# Patient Record
Sex: MALE | NOT HISPANIC OR LATINO
[De-identification: names, ages, dates, MRNs, and addresses within clinical notes are randomized per-mention and may not be internally consistent; named-entity substitution may affect disease eponyms.]

---
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
## 2022-11-22 ENCOUNTER — APPOINTMENT (OUTPATIENT)
Dept: ORTHOPEDIC SURGERY | Facility: CLINIC | Age: 22
End: 2022-11-22

## 2022-11-22 PROCEDURE — 73110 X-RAY EXAM OF WRIST: CPT | Mod: LT

## 2022-11-22 PROCEDURE — 20605 DRAIN/INJ JOINT/BURSA W/O US: CPT | Mod: LT

## 2022-11-22 PROCEDURE — 99204 OFFICE O/P NEW MOD 45 MIN: CPT | Mod: 25

## 2023-03-27 ENCOUNTER — APPOINTMENT (OUTPATIENT)
Dept: ORTHOPEDIC SURGERY | Facility: CLINIC | Age: 23
End: 2023-03-27
Payer: COMMERCIAL

## 2023-03-27 PROCEDURE — 99214 OFFICE O/P EST MOD 30 MIN: CPT | Mod: 25

## 2023-03-27 PROCEDURE — 20605 DRAIN/INJ JOINT/BURSA W/O US: CPT | Mod: LT

## 2023-05-08 ENCOUNTER — APPOINTMENT (OUTPATIENT)
Dept: ORTHOPEDIC SURGERY | Facility: CLINIC | Age: 23
End: 2023-05-08
Payer: COMMERCIAL

## 2023-05-08 VITALS — WEIGHT: 165 LBS | BODY MASS INDEX: 23.62 KG/M2 | HEIGHT: 70 IN

## 2023-05-08 DIAGNOSIS — M25.532 PAIN IN LEFT WRIST: ICD-10-CM

## 2023-05-08 DIAGNOSIS — S69.82XD OTHER SPECIFIED INJURIES OF LEFT WRIST, HAND AND FINGER(S), SUBSEQUENT ENCOUNTER: ICD-10-CM

## 2023-05-08 PROCEDURE — 99214 OFFICE O/P EST MOD 30 MIN: CPT | Mod: 25

## 2023-05-08 PROCEDURE — 99072 ADDL SUPL MATRL&STAF TM PHE: CPT

## 2023-05-10 ENCOUNTER — OUTPATIENT (OUTPATIENT)
Dept: OUTPATIENT SERVICES | Facility: HOSPITAL | Age: 23
LOS: 1 days | End: 2023-05-10
Payer: COMMERCIAL

## 2023-05-10 ENCOUNTER — APPOINTMENT (OUTPATIENT)
Dept: MRI IMAGING | Facility: CLINIC | Age: 23
End: 2023-05-10
Payer: COMMERCIAL

## 2023-05-10 DIAGNOSIS — S69.82XD OTHER SPECIFIED INJURIES OF LEFT WRIST, HAND AND FINGER(S), SUBSEQUENT ENCOUNTER: ICD-10-CM

## 2023-05-10 DIAGNOSIS — M25.532 PAIN IN LEFT WRIST: ICD-10-CM

## 2023-05-10 PROCEDURE — 73221 MRI JOINT UPR EXTREM W/O DYE: CPT | Mod: 26,LT

## 2023-05-10 PROCEDURE — 73221 MRI JOINT UPR EXTREM W/O DYE: CPT

## 2023-05-25 ENCOUNTER — APPOINTMENT (OUTPATIENT)
Dept: ORTHOPEDIC SURGERY | Facility: CLINIC | Age: 23
End: 2023-05-25
Payer: COMMERCIAL

## 2023-05-25 DIAGNOSIS — M77.8 OTHER ENTHESOPATHIES, NOT ELSEWHERE CLASSIFIED: ICD-10-CM

## 2023-05-25 PROCEDURE — 99214 OFFICE O/P EST MOD 30 MIN: CPT | Mod: 25

## 2023-05-25 PROCEDURE — 20551 NJX 1 TENDON ORIGIN/INSJ: CPT | Mod: LT

## 2023-09-20 ENCOUNTER — OUTPATIENT (OUTPATIENT)
Dept: OUTPATIENT SERVICES | Facility: HOSPITAL | Age: 23
LOS: 1 days | End: 2023-09-20
Payer: COMMERCIAL

## 2023-09-20 ENCOUNTER — APPOINTMENT (OUTPATIENT)
Dept: MRI IMAGING | Facility: CLINIC | Age: 23
End: 2023-09-20
Payer: COMMERCIAL

## 2023-09-20 DIAGNOSIS — M25.512 PAIN IN LEFT SHOULDER: ICD-10-CM

## 2023-09-20 PROCEDURE — 73221 MRI JOINT UPR EXTREM W/O DYE: CPT

## 2023-09-20 PROCEDURE — 73221 MRI JOINT UPR EXTREM W/O DYE: CPT | Mod: 26,LT

## 2024-04-26 ENCOUNTER — NON-APPOINTMENT (OUTPATIENT)
Age: 24
End: 2024-04-26

## 2024-04-30 ENCOUNTER — APPOINTMENT (OUTPATIENT)
Dept: ORTHOPEDIC SURGERY | Facility: CLINIC | Age: 24
End: 2024-04-30
Payer: COMMERCIAL

## 2024-04-30 ENCOUNTER — OUTPATIENT (OUTPATIENT)
Dept: OUTPATIENT SERVICES | Facility: HOSPITAL | Age: 24
LOS: 1 days | End: 2024-04-30
Payer: COMMERCIAL

## 2024-04-30 ENCOUNTER — APPOINTMENT (OUTPATIENT)
Dept: MRI IMAGING | Facility: CLINIC | Age: 24
End: 2024-04-30
Payer: COMMERCIAL

## 2024-04-30 DIAGNOSIS — M25.512 PAIN IN LEFT SHOULDER: ICD-10-CM

## 2024-04-30 PROCEDURE — 73221 MRI JOINT UPR EXTREM W/O DYE: CPT

## 2024-04-30 PROCEDURE — 99214 OFFICE O/P EST MOD 30 MIN: CPT

## 2024-04-30 PROCEDURE — 73221 MRI JOINT UPR EXTREM W/O DYE: CPT | Mod: 26,LT

## 2024-04-30 NOTE — PHYSICAL EXAM
[de-identified] : General Exam  Well developed, well nourished No apparent distress Oriented to person, place, and time Mood: Normal Affect: Normal Balance and coordination: Normal Gait: Normal  Left shoulder exam  Inspection: No swelling, ecchymosis or gross deformity. Skin: No masses, No lesions Tenderness: No bicipital tenderness, no tenderness to the greater tuberosity/RTC insertion, no anterior shoulder/lesser tuberosity tenderness. No tenderness SC joint, clavicle, AC joint. Instability: 1+ anterior load-and-shift negative apprehension 2+ posterior load-and-shift positive jerk positive Melissa function is 61 ROM: 160/60/T6 Impingement tests: Positive Jain AC Joint: no pain with cross arm testing Biceps: Negative speed Strength: 5/5 abduction, external rotation, and internal rotation Neuro: AIN, PIN, Ulnar nerve motor intact Sensation: Intact to light touch in radial, median, ulnar, and axillary nerve distributions Vasc: 2+ radial pulse [de-identified] :  The following radiographs were ordered and read by me during this patients visit. I reviewed each radiograph in detail with the patient and discussed the findings as highlighted below.   3 views left shoulder obtained today glenohumeral joint is well-maintained normal alignment no fracture  MRI reviewed from September 2023 with posterior labral tearing and a reverse Hill-Sachs impaction fracture

## 2024-04-30 NOTE — HISTORY OF PRESENT ILLNESS
[de-identified] : 23-year-old male here for follow-up of left shoulder injury.  He has a known posterior labral tear he was able to play this lacrosse season with that injury.  He is continue to experience pain and instability he is here today to discuss surgical treatment The patient's past medical history, past surgical history, medications, allergies, and social history were reviewed by me today with the patient and documented accordingly. In addition, the patient's family history, which is noncontributory to this visit, was also reviewed.

## 2024-04-30 NOTE — DISCUSSION/SUMMARY
[de-identified] : 23-year-old male left shoulder posterior instability.  We will obtain a new MRI to further evaluate.  We discussed treatment options at length we discussed nonoperative catheter modification physical therapy.  We discussed surgical treatment.  Left shoulder arthroscopy posterior labral repair risk benefits alternatives discussed at length including not limited to risks such as bleeding infection nerve or vessel injury continued pain stiffness redislocation need for future surgery medical complications such as DVT or PE and anesthesia complications.  All questions were answered she will schedule at her convenience

## 2024-05-01 ENCOUNTER — NON-APPOINTMENT (OUTPATIENT)
Age: 24
End: 2024-05-01

## 2024-05-06 ENCOUNTER — NON-APPOINTMENT (OUTPATIENT)
Age: 24
End: 2024-05-06

## 2024-05-06 ENCOUNTER — TRANSCRIPTION ENCOUNTER (OUTPATIENT)
Age: 24
End: 2024-05-06

## 2024-05-06 ENCOUNTER — APPOINTMENT (OUTPATIENT)
Dept: ORTHOPEDIC SURGERY | Facility: CLINIC | Age: 24
End: 2024-05-06
Payer: COMMERCIAL

## 2024-05-06 VITALS
HEART RATE: 86 BPM | TEMPERATURE: 98.2 F | SYSTOLIC BLOOD PRESSURE: 111 MMHG | DIASTOLIC BLOOD PRESSURE: 74 MMHG | OXYGEN SATURATION: 98 % | RESPIRATION RATE: 12 BRPM

## 2024-05-06 VITALS — WEIGHT: 175 LBS | HEIGHT: 70 IN | BODY MASS INDEX: 25.05 KG/M2

## 2024-05-06 DIAGNOSIS — Z01.818 ENCOUNTER FOR OTHER PREPROCEDURAL EXAMINATION: ICD-10-CM

## 2024-05-06 PROCEDURE — 99202 OFFICE O/P NEW SF 15 MIN: CPT

## 2024-05-06 NOTE — HISTORY OF PRESENT ILLNESS
[de-identified] : Patient is here today for preoperative clearance for left shoulder arthroscopic surgery.  Patient has no complaints today, he is feeling well.  Patient is not taking any medications at this time.  No significant past medical history.

## 2024-05-06 NOTE — DISCUSSION/SUMMARY
[de-identified] : Patient is medically optimized for low risk upcoming medical procedure, arthroscopy of the left shoulder.   This note was generated using dragon medical dictation software.  A reasonable effort has been made for proofreading its contents, but typos may still remain.  If there are any questions or points of clarification needed please notify my office.

## 2024-05-06 NOTE — PHYSICAL EXAM
[de-identified] : Constitutional: Well-nourished, well-developed, No acute distress Respiratory:  Good respiratory effort, no SOB Lymphatic: No regional lymphadenopathy, no lymphedema Psychiatric: Pleasant and normal affect, alert and oriented x3 Skin: Clean dry and intact B/L UE/LE Musculoskeletal: normal except where as noted in regional exam

## 2024-05-15 ENCOUNTER — TRANSCRIPTION ENCOUNTER (OUTPATIENT)
Age: 24
End: 2024-05-15

## 2024-05-16 ENCOUNTER — TRANSCRIPTION ENCOUNTER (OUTPATIENT)
Age: 24
End: 2024-05-16

## 2024-05-16 ENCOUNTER — APPOINTMENT (OUTPATIENT)
Dept: ORTHOPEDIC SURGERY | Facility: HOSPITAL | Age: 24
End: 2024-05-16

## 2024-05-16 ENCOUNTER — OUTPATIENT (OUTPATIENT)
Dept: OUTPATIENT SERVICES | Facility: HOSPITAL | Age: 24
LOS: 1 days | Discharge: HOME | End: 2024-05-16
Payer: COMMERCIAL

## 2024-05-16 VITALS
SYSTOLIC BLOOD PRESSURE: 130 MMHG | DIASTOLIC BLOOD PRESSURE: 80 MMHG | HEART RATE: 83 BPM | OXYGEN SATURATION: 98 % | RESPIRATION RATE: 17 BRPM

## 2024-05-16 VITALS
TEMPERATURE: 98 F | DIASTOLIC BLOOD PRESSURE: 75 MMHG | HEIGHT: 70 IN | WEIGHT: 175.05 LBS | HEART RATE: 76 BPM | RESPIRATION RATE: 14 BRPM | SYSTOLIC BLOOD PRESSURE: 117 MMHG

## 2024-05-16 PROCEDURE — 29806 SHO ARTHRS SRG CAPSULORRAPHY: CPT | Mod: 22,LT

## 2024-05-16 DEVICE — ANCHOR SUT PEEK PUSHLOCK 2.9X12.5MM: Type: IMPLANTABLE DEVICE | Site: LEFT | Status: FUNCTIONAL

## 2024-05-16 DEVICE — ANCHOR SUT BIO-COMP PUSHLOCK 2.9X12.5MM MUST ORD IN 5EA: Type: IMPLANTABLE DEVICE | Site: LEFT | Status: FUNCTIONAL

## 2024-05-16 DEVICE — ANCHOR SUT TAK BIO-COMP W/ 2 FIBERWIRE 3X14.5MM: Type: IMPLANTABLE DEVICE | Site: LEFT | Status: FUNCTIONAL

## 2024-05-16 RX ORDER — ONDANSETRON 8 MG/1
1 TABLET, FILM COATED ORAL
Qty: 12 | Refills: 0
Start: 2024-05-16 | End: 2024-05-19

## 2024-05-16 RX ORDER — OXYCODONE HYDROCHLORIDE 5 MG/1
1 TABLET ORAL
Qty: 20 | Refills: 0
Start: 2024-05-16 | End: 2024-05-20

## 2024-05-16 RX ORDER — FENTANYL CITRATE 50 UG/ML
25 INJECTION INTRAVENOUS ONCE
Refills: 0 | Status: DISCONTINUED | OUTPATIENT
Start: 2024-05-16 | End: 2024-05-16

## 2024-05-16 RX ORDER — FENTANYL CITRATE 50 UG/ML
50 INJECTION INTRAVENOUS ONCE
Refills: 0 | Status: DISCONTINUED | OUTPATIENT
Start: 2024-05-16 | End: 2024-05-16

## 2024-05-16 RX ORDER — METOCLOPRAMIDE HCL 10 MG
10 TABLET ORAL ONCE
Refills: 0 | Status: DISCONTINUED | OUTPATIENT
Start: 2024-05-16 | End: 2024-05-16

## 2024-05-16 RX ORDER — DOCUSATE SODIUM 100 MG
1 CAPSULE ORAL
Qty: 15 | Refills: 0
Start: 2024-05-16 | End: 2024-05-20

## 2024-05-16 RX ORDER — SODIUM CHLORIDE 9 MG/ML
1000 INJECTION, SOLUTION INTRAVENOUS
Refills: 0 | Status: DISCONTINUED | OUTPATIENT
Start: 2024-05-16 | End: 2024-05-16

## 2024-05-16 NOTE — ASU PATIENT PROFILE, ADULT - FALL HARM RISK - HARM RISK INTERVENTIONS

## 2024-05-16 NOTE — ASU DISCHARGE PLAN (ADULT/PEDIATRIC) - CARE PROVIDER_API CALL
Prateek Burleson  Orthopaedic Surgery  [address] [facility], [address]  [place] [address]  Phone: [phone]  Fax: [phone]  Follow Up Time:

## 2024-05-16 NOTE — ASU PATIENT PROFILE, ADULT - VISION (WITH CORRECTIVE LENSES IF THE PATIENT USUALLY WEARS THEM):
prescription eyeglassess/Severely impaired: cannot locate objects without hearing or touching them or patient nonresponsive.

## 2024-05-22 DIAGNOSIS — M25.312 OTHER INSTABILITY, LEFT SHOULDER: ICD-10-CM

## 2024-05-22 DIAGNOSIS — M24.812 OTHER SPECIFIC JOINT DERANGEMENTS OF LEFT SHOULDER, NOT ELSEWHERE CLASSIFIED: ICD-10-CM

## 2024-05-28 ENCOUNTER — APPOINTMENT (OUTPATIENT)
Dept: ORTHOPEDIC SURGERY | Facility: CLINIC | Age: 24
End: 2024-05-28
Payer: COMMERCIAL

## 2024-05-28 DIAGNOSIS — M25.512 PAIN IN LEFT SHOULDER: ICD-10-CM

## 2024-05-28 PROCEDURE — 99024 POSTOP FOLLOW-UP VISIT: CPT

## 2024-05-28 NOTE — HISTORY OF PRESENT ILLNESS
[de-identified] : L shoulder [de-identified] : 23 y/o M s/p Left shoulder arthroscopy, posterior labral repair, and capsulorrhaphy 05/16/2024.  He is overall doing well.  His pain is controlled he is using his sling.  He is headed home later this week for the summer and will return to campus in the fall.  He is using the sling and external rotation. [de-identified] : left shoulder exam inc healed well. no erythema  no pain gentle passive  rom able to flex/ext all fingers  silt r/u/m/ax bcr [de-identified] : 23 y/o M s/p Left shoulder arthroscopy, posterior labral repair, and capsulorrhaphy 05/16/2024. [de-identified] : We reviewed postoperative protocol and restrictions.  We reviewed intraoperative photographs.  He is given a copy of the postoperative protocol operative report preoperative note and MRI along with his arthroscopic pictures to bring to his physical therapist when he returns home.  I would normally recommend a follow-up appointment in 1 month and we will schedule a phone call at that time through his  to touch base.  He is aware to reach out with any questions regarding pain function or any other issues that may arise in the postoperative period all questions were answered

## 2024-06-27 ENCOUNTER — NON-APPOINTMENT (OUTPATIENT)
Age: 24
End: 2024-06-27

## 2025-03-04 ENCOUNTER — NON-APPOINTMENT (OUTPATIENT)
Age: 25
End: 2025-03-04

## (undated) DEVICE — SUT LASSO 90 DEGREE

## (undated) DEVICE — ARTHREX KIT SUTURETAK 3MM

## (undated) DEVICE — SOL IRR LR 3000ML

## (undated) DEVICE — GLV 7.5 PROTEXIS (WHITE)

## (undated) DEVICE — ARTHREX KIT FOR 2.9MM PUSHLOCK SHORT

## (undated) DEVICE — SHAVER BLADE GREAT WHITE 4.2MM

## (undated) DEVICE — DRAPE STERI-DRAPE INCISE 19X17"

## (undated) DEVICE — POSITIONER S&N SPIDER STABILIZATION KIT SHOULDER

## (undated) DEVICE — DRAPE SPLIT SHEET 77" X 120"

## (undated) DEVICE — WARMING BLANKET LOWER ADULT

## (undated) DEVICE — CANNULA ARTHREX PARTIALLY THREADED 5.75MMX7CM ORANGE

## (undated) DEVICE — GLV 8 PROTEXIS (WHITE)

## (undated) DEVICE — SUT LASSO 25 DEGREE LEFT TIGHT CURVE

## (undated) DEVICE — VENODYNE/SCD SLEEVE CALF MEDIUM

## (undated) DEVICE — PACK KNEE ARTHROSCOPY

## (undated) DEVICE — SUT MONOCRYL 4-0 18" PS-2

## (undated) DEVICE — Device

## (undated) DEVICE — DRSG STERISTRIPS 0.5 X 4"

## (undated) DEVICE — DRSG TEGADERM 4X4.75"

## (undated) DEVICE — DRAPE 3/4 SHEET W REINFORCEMENT 56X77"

## (undated) DEVICE — PREP CHLORAPREP HI-LITE ORANGE 26ML

## (undated) DEVICE — TUBING LINVATEC ARTHROSCOPY IN/OUTFLOW

## (undated) DEVICE — CANNULA ARTHREX TWIST IN NO SQUIRT CAP 7X7 PURPLE

## (undated) DEVICE — SHAVER BLADE GREAT WHITE 4.2MM 15DEG BENDABLE

## (undated) DEVICE — SUT PDS II 0 27" CT-2

## (undated) DEVICE — DRAPE STICKY U BLUE 60 X 84"

## (undated) DEVICE — SUT LASSO CRESCENT

## (undated) DEVICE — S&N ARTHROCARE WAND TURBOVAC 90 DEGREE